# Patient Record
Sex: FEMALE | Race: BLACK OR AFRICAN AMERICAN | NOT HISPANIC OR LATINO | Employment: OTHER | URBAN - METROPOLITAN AREA
[De-identification: names, ages, dates, MRNs, and addresses within clinical notes are randomized per-mention and may not be internally consistent; named-entity substitution may affect disease eponyms.]

---

## 2023-11-02 ENCOUNTER — TELEPHONE (OUTPATIENT)
Dept: PULMONOLOGY | Facility: CLINIC | Age: 68
End: 2023-11-02
Payer: MEDICARE

## 2023-11-02 DIAGNOSIS — R06.02 SOB (SHORTNESS OF BREATH): Primary | ICD-10-CM

## 2023-11-05 ENCOUNTER — PATIENT MESSAGE (OUTPATIENT)
Dept: PULMONOLOGY | Facility: CLINIC | Age: 68
End: 2023-11-05
Payer: MEDICARE

## 2023-11-06 ENCOUNTER — OFFICE VISIT (OUTPATIENT)
Dept: PULMONOLOGY | Facility: CLINIC | Age: 68
End: 2023-11-06
Payer: MEDICARE

## 2023-11-06 ENCOUNTER — HOSPITAL ENCOUNTER (OUTPATIENT)
Dept: RADIOLOGY | Facility: HOSPITAL | Age: 68
Discharge: HOME | End: 2023-11-06
Attending: INTERNAL MEDICINE
Payer: MEDICARE

## 2023-11-06 VITALS
SYSTOLIC BLOOD PRESSURE: 130 MMHG | HEART RATE: 80 BPM | OXYGEN SATURATION: 97 % | DIASTOLIC BLOOD PRESSURE: 80 MMHG | WEIGHT: 189.38 LBS | RESPIRATION RATE: 16 BRPM | BODY MASS INDEX: 29.72 KG/M2 | HEIGHT: 67 IN

## 2023-11-06 DIAGNOSIS — R91.1 SOLITARY PULMONARY NODULE: ICD-10-CM

## 2023-11-06 DIAGNOSIS — R91.1 NODULE OF LOWER LOBE OF RIGHT LUNG: Primary | ICD-10-CM

## 2023-11-06 DIAGNOSIS — R06.02 SOB (SHORTNESS OF BREATH): ICD-10-CM

## 2023-11-06 PROBLEM — I10 HYPERTENSION: Status: ACTIVE | Noted: 2023-11-06

## 2023-11-06 PROBLEM — I63.9 CEREBROVASCULAR ACCIDENT (CVA): Status: ACTIVE | Noted: 2019-10-18

## 2023-11-06 PROBLEM — N30.00 ACUTE CYSTITIS: Status: ACTIVE | Noted: 2023-05-31

## 2023-11-06 PROBLEM — N81.3 THIRD DEGREE UTERINE PROLAPSE: Status: ACTIVE | Noted: 2023-05-31

## 2023-11-06 PROBLEM — E11.9 TYPE 2 DIABETES MELLITUS: Status: ACTIVE | Noted: 2023-11-06

## 2023-11-06 PROBLEM — E78.5 HYPERLIPIDEMIA: Status: ACTIVE | Noted: 2023-11-06

## 2023-11-06 PROCEDURE — 1101F PT FALLS ASSESS-DOCD LE1/YR: CPT | Mod: CPTII,S$GLB,, | Performed by: INTERNAL MEDICINE

## 2023-11-06 PROCEDURE — 99204 PR OFFICE/OUTPT VISIT, NEW, LEVL IV, 45-59 MIN: ICD-10-PCS | Mod: S$GLB,,, | Performed by: INTERNAL MEDICINE

## 2023-11-06 PROCEDURE — 3075F SYST BP GE 130 - 139MM HG: CPT | Mod: CPTII,S$GLB,, | Performed by: INTERNAL MEDICINE

## 2023-11-06 PROCEDURE — 99204 OFFICE O/P NEW MOD 45 MIN: CPT | Mod: S$GLB,,, | Performed by: INTERNAL MEDICINE

## 2023-11-06 PROCEDURE — 3079F DIAST BP 80-89 MM HG: CPT | Mod: CPTII,S$GLB,, | Performed by: INTERNAL MEDICINE

## 2023-11-06 PROCEDURE — 3008F PR BODY MASS INDEX (BMI) DOCUMENTED: ICD-10-PCS | Mod: CPTII,S$GLB,, | Performed by: INTERNAL MEDICINE

## 2023-11-06 PROCEDURE — 3075F PR MOST RECENT SYSTOLIC BLOOD PRESS GE 130-139MM HG: ICD-10-PCS | Mod: CPTII,S$GLB,, | Performed by: INTERNAL MEDICINE

## 2023-11-06 PROCEDURE — 3079F PR MOST RECENT DIASTOLIC BLOOD PRESSURE 80-89 MM HG: ICD-10-PCS | Mod: CPTII,S$GLB,, | Performed by: INTERNAL MEDICINE

## 2023-11-06 PROCEDURE — 99999 PR PBB SHADOW E&M-EST. PATIENT-LVL III: CPT | Mod: PBBFAC,,, | Performed by: INTERNAL MEDICINE

## 2023-11-06 PROCEDURE — 99999 PR PBB SHADOW E&M-EST. PATIENT-LVL III: ICD-10-PCS | Mod: PBBFAC,,, | Performed by: INTERNAL MEDICINE

## 2023-11-06 PROCEDURE — 71046 X-RAY EXAM CHEST 2 VIEWS: CPT | Mod: 26,,, | Performed by: RADIOLOGY

## 2023-11-06 PROCEDURE — 1101F PR PT FALLS ASSESS DOC 0-1 FALLS W/OUT INJ PAST YR: ICD-10-PCS | Mod: CPTII,S$GLB,, | Performed by: INTERNAL MEDICINE

## 2023-11-06 PROCEDURE — 71046 X-RAY EXAM CHEST 2 VIEWS: CPT | Mod: TC

## 2023-11-06 PROCEDURE — 71046 XR CHEST PA AND LATERAL: ICD-10-PCS | Mod: 26,,, | Performed by: RADIOLOGY

## 2023-11-06 PROCEDURE — 3288F FALL RISK ASSESSMENT DOCD: CPT | Mod: CPTII,S$GLB,, | Performed by: INTERNAL MEDICINE

## 2023-11-06 PROCEDURE — 3288F PR FALLS RISK ASSESSMENT DOCUMENTED: ICD-10-PCS | Mod: CPTII,S$GLB,, | Performed by: INTERNAL MEDICINE

## 2023-11-06 PROCEDURE — 3008F BODY MASS INDEX DOCD: CPT | Mod: CPTII,S$GLB,, | Performed by: INTERNAL MEDICINE

## 2023-11-06 RX ORDER — EMPAGLIFLOZIN 10 MG/1
10 TABLET, FILM COATED ORAL
COMMUNITY
Start: 2023-11-05

## 2023-11-06 RX ORDER — SIMVASTATIN 10 MG/1
10 TABLET, FILM COATED ORAL NIGHTLY
COMMUNITY
Start: 2023-11-05

## 2023-11-06 RX ORDER — AMLODIPINE BESYLATE 5 MG/1
5 TABLET ORAL
COMMUNITY
Start: 2023-11-05

## 2023-11-06 RX ORDER — IBANDRONATE SODIUM 150 MG/1
150 TABLET, FILM COATED ORAL
COMMUNITY
Start: 2023-09-05

## 2023-11-06 RX ORDER — SOLIFENACIN SUCCINATE 5 MG/1
5 TABLET, FILM COATED ORAL
COMMUNITY
Start: 2023-10-29

## 2023-11-06 RX ORDER — LINAGLIPTIN 5 MG/1
5 TABLET, FILM COATED ORAL
COMMUNITY
Start: 2023-11-05

## 2023-11-06 NOTE — LETTER
November 6, 2023      Chi Gann MD  [address]  [facility] [address]           The [address]  [address] [facility] [place] [address]  Phone: [phone]  Fax: [phone]          Patient: Yokasta Pizano   MR Number: [id number]   YOB: 1955   Date of Visit: 11/6/2023           Thank you for referring Yokasta Pizano to me for evaluation. Attached you will find relevant portions of my assessment and plan of care.    If you have questions, please do not hesitate to call me. I look forward to following Yokasta Pizano along with you.    Sincerely,    Dank Spence MD    Enclosure  CC:  Lisha Buchanan MD    If you would like to receive this communication electronically, please contact [email or web address] or [phone] to request [employer] Link access.    [employer] Link is a tool which provides read-only access to select patient information with whom you have a relationship. It's easy to use and provides real time access to review your patients record including encounter summaries, notes, results, and demographic information.    If you feel you have received this communication in error or would no longer like to receive these types of communications, please e-mail [email or web address]

## 2023-11-06 NOTE — TELEPHONE ENCOUNTER
The reason for visit listed is abnormal CT scan  Does she have an CT scans of the chest performed in the past ? I reviewed [facility] and [facility] - no luck  I cannot find anything in the system.  Go ahead and get a Chest X Ray as ordered

## 2023-11-06 NOTE — PROGRESS NOTES
"Subjective:     Patient ID: Yokasta Pizano is a 68 y.o. female.    Chief Complaint:      HPI Preop GYN surgery noted lung nodule on CT of the chest    Lung Nodule  She presents for evaluation and treatment of a lung nodule. The patient had an abnormal imaging study but reports experiencing no current or past pulmonary symptoms. The patient denies other associated symptoms. She has never smoked. The patient has no known exposure to tuberculosis. The patient does not have a history of cancer.      Past Medical History:   Diagnosis Date    Essential (primary) hypertension     Type 2 diabetes mellitus without complications     Uterine prolapse      No past surgical history on file.  Review of patient's allergies indicates:   Allergen Reactions    Ace inhibitors     Atorvastatin     Penicillins      Other reaction(s): Hives (code-173008110,SNOMED-CT)    [place]      Other reaction(s): Hives (code-172794507,SNOMED-CT)     Current Outpatient Medications on File Prior to Visit   Medication Sig Dispense Refill    amLODIPine (NORVASC) 5 MG tablet Take 5 mg by mouth.      ibandronate (BONIVA) 150 mg tablet Take 150 mg by mouth every 30 days.      JARDIANCE 10 mg tablet Take 10 mg by mouth.      simvastatin (ZOCOR) 10 MG tablet Take 10 mg by mouth every evening.      solifenacin (VESICARE) 5 MG tablet Take 5 mg by mouth.      TRADJENTA 5 mg Tab tablet Take 5 mg by mouth.       No current facility-administered medications on file prior to visit.     Social History     Socioeconomic History    Marital status:    Tobacco Use    Smoking status: Never    Smokeless tobacco: Never     No family history on file.    Review of Systems   Musculoskeletal:  Positive for arthralgias.       Objective:      /80   Pulse 80   Resp 16   Ht 5' 7" (1.702 m)   Wt 85.9 kg (189 lb 6 oz)   SpO2 97%   BMI 29.66 kg/m²   Physical Exam  Vitals and nursing note reviewed.   Constitutional:       Appearance: Normal appearance. She is " "well-developed.   HENT:      Head: Normocephalic and atraumatic.      Nose: Nose normal.   Eyes:      Conjunctiva/sclera: Conjunctivae normal.      Pupils: Pupils are equal, round, and reactive to light.   Neck:      Thyroid: No thyromegaly.      Vascular: No JVD.      Trachea: No tracheal deviation.   Cardiovascular:      Rate and Rhythm: Normal rate and regular rhythm.      Heart sounds: Normal heart sounds.   Pulmonary:      Effort: Pulmonary effort is normal. No respiratory distress.      Breath sounds: Normal breath sounds. No wheezing or rales.   Chest:      Chest wall: No tenderness.   Abdominal:      General: Bowel sounds are normal.      Palpations: Abdomen is soft.   Musculoskeletal:         General: Normal range of motion.      Cervical back: Neck supple.   Lymphadenopathy:      Cervical: No cervical adenopathy.   Skin:     General: Skin is warm and dry.   Neurological:      Mental Status: She is alert and oriented to person, place, and time.       Personal Diagnostic Review  CT reviewed - see report from outside disk        11/6/2023     2:39 PM   Pulmonary Studies Review   SpO2 97 %   Height 5' 7" (1.702 m)   Weight 85.9 kg (189 lb 6 oz)   BMI (Calculated) 29.7   Predicted Distance 307.89   Predicted Distance Meters (Calculated) 447.93 meters       X-Ray Chest PA And Lateral  Narrative: EXAMINATION:  XR CHEST PA AND LATERAL    CLINICAL HISTORY:  SOB; Shortness of breath    TECHNIQUE:  PA and lateral views of the chest were performed.    COMPARISON:  Prior radiographs    FINDINGS:  Mild cardiomegaly.  Mediastinal contours unremarkable.  Lungs are clear.  Osseous structures are intact.  Impression: Mild cardiomegaly without definite active pulmonary process.    Electronically signed by: Doron Fonseca MD  Date:    11/06/2023  Time:    14:29      Office Spirometry Results:         11/6/2023     2:39 PM   Pulmonary Function Tests   SpO2 97 %   Height 5' 7" (1.702 m)   Weight 85.9 kg (189 lb 6 oz)   BMI " "(Calculated) 29.7         11/6/2023     2:39 PM   Pulmonary Studies Review   SpO2 97 %   Height 5' 7" (1.702 m)   Weight 85.9 kg (189 lb 6 oz)   BMI (Calculated) 29.7   Predicted Distance 307.89   Predicted Distance Meters (Calculated) 447.93 meters     US Kidney  Order: [id number]  Addendum    Addendum by Johnson Chase MD on 05/09/2017  9:14 AM CDT   The examination was performed 3/13/2017  Impression    No acute abnormalities.  Narrative    Renal ultrasound     Indication:  Stage II kidney disease          Comparison:  none     The kidneys are normal in size measuring 10.9 cm in length on the right and 12.1 cm in length on the left. Cortical echotexture is normal.  No solid mass, shadowing calculus, or hydronephrosis is seen. There is a 2.3 cm simple cyst of the right kidney.   There is a 1.3 cm simple cyst of the left kidney.  Exam End: 03/13/17 10:53 Last Resulted: 03/13/17 13:32   Received From: [facility]idi[facility]ili[facility]  Result Received: 11/05/23 19:40                 Assessment:            Nodule of lower lobe of right lung  -     NM PET CT FDG Skull Base to Mid Thigh; Future; Expected date: 11/06/2023    Solitary pulmonary nodule  -     NM PET CT FDG Skull Base to Mid Thigh; Future; Expected date: 11/06/2023          Outpatient Encounter Medications as of 11/6/2023   Medication Sig Dispense Refill    amLODIPine (NORVASC) 5 MG tablet Take 5 mg by mouth.      ibandronate (BONIVA) 150 mg tablet Take 150 mg by mouth every 30 days.      JARDIANCE 10 mg tablet Take 10 mg by mouth.      simvastatin (ZOCOR) 10 MG tablet Take 10 mg by mouth every evening.      solifenacin (VESICARE) 5 MG tablet Take 5 mg by mouth.      TRADJENTA 5 mg Tab tablet Take 5 mg by mouth.       No facility-administered encounter medications on file as of 11/6/2023.     Plan:       Requested Prescriptions      No prescriptions requested or ordered in this encounter "     Problem List Items Addressed This Visit    None  Visit Diagnoses       Nodule of lower lobe of right lung    -  Primary    Relevant Orders    NM PET CT FDG Skull Base to Mid Thigh    Solitary pulmonary nodule        Relevant Orders    NM PET CT FDG Skull Base to Mid Thigh               Follow up in about 2 weeks (around 11/20/2023) for PET on return.    MEDICAL DECISION MAKING: Moderate to high complexity.  Overall, the multiple problems listed are of moderate to high severity that may impact quality of life and activities of daily living. Side effects of medications, treatment plan as well as options and alternatives reviewed and discussed with patient. There was counseling of patient concerning these issues.    Total time spent in counseling and coordination of care - 45  minutes of total time spent on the encounter, which includes face to face time and non-face to face time preparing to see the patient (eg, review of tests), Obtaining and/or reviewing separately obtained history, Documenting clinical information in the electronic or other health record, Independently interpreting results (not separately reported) and communicating results to the patient/family/caregiver, or Care coordination (not separately reported).    Time was used in discussion of prognosis, risks, benefits of treatment, instructions and compliance with regimen . Discussion with other physicians and/or health care providers - home health or for use of durable medical equipment (oxygen, nebulizers, CPAP, BiPAP) occurred.

## 2023-11-08 ENCOUNTER — HOSPITAL ENCOUNTER (OUTPATIENT)
Dept: RADIOLOGY | Facility: HOSPITAL | Age: 68
Discharge: HOME | End: 2023-11-08
Attending: INTERNAL MEDICINE
Payer: MEDICARE

## 2023-11-08 DIAGNOSIS — R91.1 SOLITARY PULMONARY NODULE: ICD-10-CM

## 2023-11-08 DIAGNOSIS — R91.1 NODULE OF LOWER LOBE OF RIGHT LUNG: ICD-10-CM

## 2023-11-08 PROCEDURE — 78815 NM PET CT FDG SKULL BASE TO MID THIGH: ICD-10-PCS | Mod: 26,PI,, | Performed by: RADIOLOGY

## 2023-11-08 PROCEDURE — A9552 F18 FDG: HCPCS

## 2023-11-08 PROCEDURE — 78815 PET IMAGE W/CT SKULL-THIGH: CPT | Mod: 26,PI,, | Performed by: RADIOLOGY

## 2023-11-10 ENCOUNTER — OFFICE VISIT (OUTPATIENT)
Dept: PULMONOLOGY | Facility: CLINIC | Age: 68
End: 2023-11-10
Payer: MEDICARE

## 2023-11-10 DIAGNOSIS — R91.1 SOLITARY PULMONARY NODULE: Primary | ICD-10-CM

## 2023-11-10 DIAGNOSIS — N81.3 THIRD DEGREE UTERINE PROLAPSE: ICD-10-CM

## 2023-11-10 DIAGNOSIS — Z01.811 PREOP PULMONARY/RESPIRATORY EXAM: ICD-10-CM

## 2023-11-10 PROCEDURE — 1160F RVW MEDS BY RX/DR IN RCRD: CPT | Mod: CPTII,S$GLB,, | Performed by: INTERNAL MEDICINE

## 2023-11-10 PROCEDURE — 99214 OFFICE O/P EST MOD 30 MIN: CPT | Mod: S$GLB,,, | Performed by: INTERNAL MEDICINE

## 2023-11-10 PROCEDURE — 1159F MED LIST DOCD IN RCRD: CPT | Mod: CPTII,S$GLB,, | Performed by: INTERNAL MEDICINE

## 2023-11-10 PROCEDURE — 1160F PR REVIEW ALL MEDS BY PRESCRIBER/CLIN PHARMACIST DOCUMENTED: ICD-10-PCS | Mod: CPTII,S$GLB,, | Performed by: INTERNAL MEDICINE

## 2023-11-10 PROCEDURE — 1159F PR MEDICATION LIST DOCUMENTED IN MEDICAL RECORD: ICD-10-PCS | Mod: CPTII,S$GLB,, | Performed by: INTERNAL MEDICINE

## 2023-11-10 PROCEDURE — 99214 PR OFFICE/OUTPT VISIT, EST, LEVL IV, 30-39 MIN: ICD-10-PCS | Mod: S$GLB,,, | Performed by: INTERNAL MEDICINE

## 2023-11-10 NOTE — LETTER
November 10, 2023      [facility]  [address] [place] [address]  [place] [address]  Phone: [phone]  Fax: [phone]          Patient: Yokasta Pizano   MR Number: [id number]   YOB: 1955   Date of Visit: 11/10/2023       Dear No ref. provider found:    Thank you for referring Yokasta Pizano to me for evaluation. Attached you will find relevant portions of my assessment and plan of care.    If you have questions, please do not hesitate to call me. I look forward to following Yokasta Pizano along with you.    Sincerely,    Dank Spence MD    Enclosure  CC:  MD Chi Lanza MD    If you would like to receive this communication electronically, please contact [email or web address] or [phone] to request [employer] access.    [employer] is a tool which provides read-only access to select patient information with whom you have a relationship. It's easy to use and provides real time access to review your patients record including encounter summaries, notes, results, and demographic information.    If you feel you have received this communication in error or would no longer like to receive these types of communications, please e-mail [email or web address]

## 2024-02-05 PROBLEM — I63.9 CEREBROVASCULAR ACCIDENT (CVA): Status: RESOLVED | Noted: 2019-10-18 | Resolved: 2024-02-05

## 2024-02-07 ENCOUNTER — TELEPHONE (OUTPATIENT)
Dept: PULMONOLOGY | Facility: CLINIC | Age: 69
End: 2024-02-07
Payer: MEDICARE

## 2024-02-07 ENCOUNTER — HOSPITAL ENCOUNTER (OUTPATIENT)
Dept: RADIOLOGY | Facility: HOSPITAL | Age: 69
Discharge: HOME | End: 2024-02-07
Attending: INTERNAL MEDICINE
Payer: MEDICARE

## 2024-02-07 ENCOUNTER — CLINICAL SUPPORT (OUTPATIENT)
Dept: PULMONOLOGY | Facility: CLINIC | Age: 69
End: 2024-02-07
Attending: INTERNAL MEDICINE
Payer: MEDICARE

## 2024-02-07 VITALS
DIASTOLIC BLOOD PRESSURE: 88 MMHG | HEART RATE: 80 BPM | SYSTOLIC BLOOD PRESSURE: 124 MMHG | WEIGHT: 189.63 LBS | OXYGEN SATURATION: 98 % | HEIGHT: 67 IN | RESPIRATION RATE: 18 BRPM | BODY MASS INDEX: 29.76 KG/M2

## 2024-02-07 DIAGNOSIS — R06.02 SOB (SHORTNESS OF BREATH): ICD-10-CM

## 2024-02-07 DIAGNOSIS — Z01.811 PREOP PULMONARY/RESPIRATORY EXAM: ICD-10-CM

## 2024-02-07 DIAGNOSIS — Z01.811 PREOP PULMONARY/RESPIRATORY EXAM: Primary | ICD-10-CM

## 2024-02-07 DIAGNOSIS — R91.1 SOLITARY PULMONARY NODULE: Primary | ICD-10-CM

## 2024-02-07 LAB
ALLENS TEST: ABNORMAL
DELSYS: ABNORMAL
FIO2: 21
HCO3 UR-SCNC: 24.9 MMOL/L (ref 24–28)
MODE: ABNORMAL
PCO2 BLDA: 34.9 MMHG (ref 35–45)
PH SMN: 7.46 [PH] (ref 7.35–7.45)
PO2 BLDA: 104 MMHG (ref 80–100)
POC BE: 1 MMOL/L
POC SATURATED O2: 98 % (ref 95–100)
SAMPLE: ABNORMAL
SITE: ABNORMAL

## 2024-02-07 PROCEDURE — 1159F MED LIST DOCD IN RCRD: CPT | Mod: CPTII,S$GLB,, | Performed by: INTERNAL MEDICINE

## 2024-02-07 PROCEDURE — 71046 X-RAY EXAM CHEST 2 VIEWS: CPT | Mod: 26,,, | Performed by: RADIOLOGY

## 2024-02-07 PROCEDURE — 82803 BLOOD GASES ANY COMBINATION: CPT | Mod: S$GLB,,, | Performed by: INTERNAL MEDICINE

## 2024-02-07 PROCEDURE — 3008F BODY MASS INDEX DOCD: CPT | Mod: CPTII,S$GLB,, | Performed by: INTERNAL MEDICINE

## 2024-02-07 PROCEDURE — 94010 BREATHING CAPACITY TEST: CPT | Mod: S$GLB,,, | Performed by: INTERNAL MEDICINE

## 2024-02-07 PROCEDURE — 1160F RVW MEDS BY RX/DR IN RCRD: CPT | Mod: CPTII,S$GLB,, | Performed by: INTERNAL MEDICINE

## 2024-02-07 PROCEDURE — 36600 WITHDRAWAL OF ARTERIAL BLOOD: CPT | Mod: 59,S$GLB,, | Performed by: INTERNAL MEDICINE

## 2024-02-07 PROCEDURE — 1126F AMNT PAIN NOTED NONE PRSNT: CPT | Mod: CPTII,S$GLB,, | Performed by: INTERNAL MEDICINE

## 2024-02-07 PROCEDURE — 3074F SYST BP LT 130 MM HG: CPT | Mod: CPTII,S$GLB,, | Performed by: INTERNAL MEDICINE

## 2024-02-07 PROCEDURE — 71046 X-RAY EXAM CHEST 2 VIEWS: CPT | Mod: TC

## 2024-02-07 PROCEDURE — 1101F PT FALLS ASSESS-DOCD LE1/YR: CPT | Mod: CPTII,S$GLB,, | Performed by: INTERNAL MEDICINE

## 2024-02-07 PROCEDURE — 3288F FALL RISK ASSESSMENT DOCD: CPT | Mod: CPTII,S$GLB,, | Performed by: INTERNAL MEDICINE

## 2024-02-07 PROCEDURE — 99999 PR PBB SHADOW E&M-EST. PATIENT-LVL III: CPT | Mod: PBBFAC,,, | Performed by: INTERNAL MEDICINE

## 2024-02-07 PROCEDURE — 3079F DIAST BP 80-89 MM HG: CPT | Mod: CPTII,S$GLB,, | Performed by: INTERNAL MEDICINE

## 2024-02-07 PROCEDURE — 99214 OFFICE O/P EST MOD 30 MIN: CPT | Mod: 25,S$GLB,, | Performed by: INTERNAL MEDICINE

## 2024-02-07 NOTE — TELEPHONE ENCOUNTER
----- Message from Marga Ludwig sent at 2/7/2024 12:03 PM CST -----  Contact: self  [phone]  Patient called in this afternoon stating she needs her clearance for surgery, she is scheduled to see the doctor this afternoon but she does not have a ride. Her surgery is tomorrow and if she does not receive the clearance they will cancel her surgery. Please call back  [phone] thanks willie

## 2024-02-07 NOTE — PROGRESS NOTES
Subjective:     Patient ID: Yokasta Pizano is a 68 y.o. female.    Chief Complaint:  Review of PET scan     HPI Preop Urologic surgery for Urethral Stent. Also a history of lung nodule on CT of the chest. Nonsmoker, no prior history of pulmonary problems    Lung Nodule  She presents for evaluation and treatment of a lung nodule. The patient had an abnormal imaging study but reports experiencing no current or past pulmonary symptoms. The patient denies other associated symptoms. She has never smoked. The patient has no known exposure to tuberculosis. The patient does not have a history of cancer.  No history of active pulmonary problems      Dyspnea  Patient complains of shortness of breath. Symptoms occur after more than one flight stairs. Symptoms began 1 year ago, unchanged since. Associated symptoms include  no cough. She denies chest pain, located left chest. She does not have had recent travel. Weight has been stable. Symptoms are exacerbated by strenuous activity. Symptoms are alleviated by rest.       Past Medical History:   Diagnosis Date    Essential (primary) hypertension     Solitary pulmonary nodule - PET negative 11/10/2023    Type 2 diabetes mellitus without complications     Uterine prolapse      No past surgical history on file.  Review of patient's allergies indicates:   Allergen Reactions    Ace inhibitors     Atorvastatin     Penicillins      Other reaction(s): Hives (code-018777262,SNOMED-CT)    [place]      Other reaction(s): Hives (code-937171326,SNOMED-CT)     Current Outpatient Medications on File Prior to Visit   Medication Sig Dispense Refill    amLODIPine (NORVASC) 5 MG tablet Take 5 mg by mouth.      ibandronate (BONIVA) 150 mg tablet Take 150 mg by mouth every 30 days.      JARDIANCE 10 mg tablet Take 10 mg by mouth.      simvastatin (ZOCOR) 10 MG tablet Take 10 mg by mouth every evening.      solifenacin (VESICARE) 5 MG tablet Take 5 mg by mouth.      TRADJENTA 5 mg Tab tablet Take 5  "mg by mouth.       No current facility-administered medications on file prior to visit.     Social History     Socioeconomic History    Marital status:    Tobacco Use    Smoking status: Never    Smokeless tobacco: Never     No family history on file.    Review of Systems   Musculoskeletal:  Positive for arthralgias.       Objective:      /88   Pulse 80   Resp 18   Ht 5' 7" (1.702 m)   Wt 86 kg (189 lb 9.5 oz)   SpO2 98%   BMI 29.69 kg/m²   Physical Exam  Vitals and nursing note reviewed.   Constitutional:       Appearance: Normal appearance. She is well-developed.   HENT:      Head: Normocephalic and atraumatic.      Nose: Nose normal.   Eyes:      Conjunctiva/sclera: Conjunctivae normal.      Pupils: Pupils are equal, round, and reactive to light.   Neck:      Thyroid: No thyromegaly.      Vascular: No JVD.      Trachea: No tracheal deviation.   Cardiovascular:      Rate and Rhythm: Normal rate and regular rhythm.      Heart sounds: Normal heart sounds.   Pulmonary:      Effort: Pulmonary effort is normal. No respiratory distress.      Breath sounds: Normal breath sounds. No wheezing or rales.   Chest:      Chest wall: No tenderness.   Abdominal:      General: Bowel sounds are normal.      Palpations: Abdomen is soft.   Musculoskeletal:         General: Normal range of motion.      Cervical back: Neck supple.   Lymphadenopathy:      Cervical: No cervical adenopathy.   Skin:     General: Skin is warm and dry.   Neurological:      Mental Status: She is alert and oriented to person, place, and time.       Personal Diagnostic Review  ABG - normal   Latest Reference Range & Units 02/07/24 15:03   POC PH 7.35 - 7.45  7.461 (H)   POC PCO2 35 - 45 mmHg 34.9 (L)   POC PO2 80 - 100 mmHg 104 (H)   POC HCO3 24 - 28 mmol/L 24.9   POC SATURATED O2 95 - 100 % 98   Sample  ARTERIAL   POC BE -2 to 2 mmol/L 1   FiO2  21   [employer]Sys  Room Air   Site  LB   Mode  SPONT   (H): Data is abnormally high  (L): Data is " abnormally low        EXAMINATION:  NM PET CT FDG SKULL BASE TO MID THIGH     CLINICAL HISTORY:  Lung nodule, > 8mm; Solitary pulmonary nodule     TECHNIQUE:  11.6 mCi of F18-FDG was administered intravenously in the left antecubital.  After an approximately 60 min distribution time, PET/CT images were acquired from the skull base to the mid thigh. Transmission images were acquired to correct for attenuation using a whole body low-dose CT scan without contrast with the arms positioned above the head. Glycemia at the time of injection was 93 mg/dL.     COMPARISON:  Chest radiograph on 11/06/2023     FINDINGS:  Neck: No abnormal foci of FDG uptake identified.     Chest: 1.1 x 1.6 cm nodular opacity in the left lung base that has FDG accumulation similar to blood pool.  No hypermetabolic intrathoracic lymphadenopathy.  Non FDG avid soft tissue density mass in the superior mediastinum measuring 2.2 x 2.3 cm on axial image 100 of series 3.     Mildly hypermetabolic right axillary lymph node measuring 1.0 x 1.8 cm on axial image 114 with maximum SUV of 3.7.     Abdomen: No abnormal foci of FDG uptake identified.  Moderate to severe right and mild left hydroureteronephrosis.  There is extrinsic mass effect upon the distal ureters secondary marked pelvic prolapse resulting in a cystocele and rectocele.     At least 3 mildly hypermetabolic ovoid soft tissue masses in the lower abdomen adjacent to the right ureter measuring 1.7 x 2.4 cm on axial image 301 of series 3 with maximum SUV of 7.1.  The 2nd largest mass measures 1.4 x 1.7 cm on axial image 267.  These likely represent lymph nodes.     Pelvis: Mildly hypermetabolic left external iliac lymph node measuring 2.4 x 0.9 cm with maximum SUV of 5.9.  Mildly hypermetabolic left inguinal lymph node measuring 1.1 x 0.9 cm with maximum SUV of 4.5.     Musculoskeletal: No abnormal foci of FDG uptake identified.     Impression:     1.6 cm nodular opacity in the left lung base is  "non FDG avid.  Recommend six-month follow-up chest CT.     Several mildly hypermetabolic, enlarged lymph nodes in the right axilla, lower abdominal mesentery, and left pelvis raising concern for malignancy.  Consider biopsy of the right axillary lymph node.     Non FDG avid soft tissue density mass in the superior mediastinum of indeterminate etiology, likely benign.     Bilateral hydroureteronephrosis, right leg greater than left secondary to extrinsic mass effect from the large cystocele.        Electronically signed by: Wil Oropeza MD  Date:                                            11/08/2023  Time:                                           10:06      CT reviewed - see report from outside disk        2/7/2024     3:38 PM   Pulmonary Studies Review   SpO2 98 %   Height 5' 7" (1.702 m)   Weight 86 kg (189 lb 9.5 oz)   BMI (Calculated) 29.7   Predicted Distance 307.89   Predicted Distance Meters (Calculated) 447.7 meters       X-Ray Chest PA And Lateral  Narrative: EXAMINATION:  XR CHEST PA AND LATERAL    CLINICAL HISTORY:  SOB; Shortness of breath    TECHNIQUE:  PA and lateral views of the chest were performed.    COMPARISON:  11/06/2023    FINDINGS:  The lungs are clear and free of infiltrate.  No pleural effusion or pneumothorax. The heart is not enlarged. There is tortuosity of the descending thoracic aorta.  Impression: 1.  No acute cardiopulmonary process.    Electronically signed by: Eljiah Koch DO  Date:    02/07/2024  Time:    14:47      Office Spirometry Results:         2/7/2024     3:38 PM 11/6/2023     2:39 PM   Pulmonary Function Tests   SpO2 98 % 97 %   Height 5' 7" (1.702 m) 5' 7" (1.702 m)   Weight 86 kg (189 lb 9.5 oz) 85.9 kg (189 lb 6 oz)   BMI (Calculated) 29.7 29.7         2/7/2024     3:38 PM   Pulmonary Studies Review   SpO2 98 %   Height 5' 7" (1.702 m)   Weight 86 kg (189 lb 9.5 oz)   BMI (Calculated) 29.7   Predicted Distance 307.89   Predicted Distance Meters (Calculated) 447.7 meters "     US Kidney  Order: [id number]  Addendum    Addendum by Johnson Chase MD on 05/09/2017  9:14 AM CDT   The examination was performed 3/13/2017  Impression    No acute abnormalities.  Narrative    Renal ultrasound     Indication:  Stage II kidney disease          Comparison:  none     The kidneys are normal in size measuring 10.9 cm in length on the right and 12.1 cm in length on the left. Cortical echotexture is normal.  No solid mass, shadowing calculus, or hydronephrosis is seen. There is a 2.3 cm simple cyst of the right kidney.   There is a 1.3 cm simple cyst of the left kidney.  Exam End: 03/13/17 10:53 Last Resulted: 03/13/17 13:32   [facility] From[facility]  Result Received: 11/05/23 19:40                 Assessment:       Preop pulmonary/respiratory exam - patient is cleared for surgery from a pulmonary standpoint  Patient is cleared for surgery .  Nonsmoker -  No present pulmonary symptoms.    Solitary pulmonary nodule - PET negative  Stable, Negative PET    Solitary pulmonary nodule    Preop pulmonary/respiratory exam          Outpatient Encounter Medications as of 2/7/2024   Medication Sig Dispense Refill    amLODIPine (NORVASC) 5 MG tablet Take 5 mg by mouth.      ibandronate (BONIVA) 150 mg tablet Take 150 mg by mouth every 30 days.      JARDIANCE 10 mg tablet Take 10 mg by mouth.      simvastatin (ZOCOR) 10 MG tablet Take 10 mg by mouth every evening.      solifenacin (VESICARE) 5 MG tablet Take 5 mg by mouth.      TRADJENTA 5 mg Tab tablet Take 5 mg by mouth.       No facility-administered encounter medications on file as of 2/7/2024.     Plan:       Requested Prescriptions      No prescriptions requested or ordered in this encounter     Problem List Items Addressed This Visit       Preop pulmonary/respiratory exam - patient is cleared for surgery from a pulmonary standpoint    Current Assessment & Plan     Patient is  cleared for surgery .  Nonsmoker -  No present pulmonary symptoms.         Solitary pulmonary nodule - PET negative - Primary    Overview     Nonsmoker, PET negative         Current Assessment & Plan     Stable, Negative PET             Follow up if symptoms worsen or fail to improve.    MEDICAL DECISION MAKING: Moderate to high complexity.  Overall, the multiple problems listed are of moderate to high severity that may impact quality of life and activities of daily living. Side effects of medications, treatment plan as well as options and alternatives reviewed and discussed with patient. There was counseling of patient concerning these issues.    Total time spent in counseling and coordination of care - 30  minutes of total time spent on the encounter, which includes face to face time and non-face to face time preparing to see the patient (eg, review of tests), Obtaining and/or reviewing separately obtained history, Documenting clinical information in the electronic or other health record, Independently interpreting results (not separately reported) and communicating results to the patient/family/caregiver, or Care coordination (not separately reported).    Time was used in discussion of prognosis, risks, benefits of treatment, instructions and compliance with regimen . Discussion with other physicians and/or health care providers - home health or for use of durable medical equipment (oxygen, nebulizers, CPAP, BiPAP) occurred.

## 2024-02-07 NOTE — PROCEDURES
ABG completed. See ABG Below.    Component      Latest Ref Rng 2/7/2024   POC PH      7.35 - 7.45  7.461 (H)    POC PCO2      35 - 45 mmHg 34.9 (L)    POC PO2      80 - 100 mmHg 104 (H)    POC HCO3      24 - 28 mmol/L 24.9    POC BE      -2 to 2 mmol/L 1    POC SATURATED O2      95 - 100 % 98    Sample ARTERIAL    Site LB    Allens Test N/A    DelSys Room Air    Mode SPONT    FiO2 21       Legend:  (H) High  (L) Low      Interpretation:    [x] Arterial blood gases on room air are abnormal demonstrating hypocarbia (pCO2 < 35 mmHg)    [x] Arterial blood gases on room air are abnormal demonstrating hyperoxemia (pO2 >120 mmHg)      The table below summarizes the main interpretations of the relationship between the arterial blood gases, pH, pCO2 and HCO-3    []    I

## 2024-02-07 NOTE — TELEPHONE ENCOUNTER
Spoke to pt concerning appts for today.  She stated that she doesn't have a ride and might not be able to make it.  Explained to her that the surgeon only notify us this morning of the need for surgery clearance. Appts were scheduled immediately and a VM left for her at 10:23am.

## 2024-02-07 NOTE — TELEPHONE ENCOUNTER
----- Message from Sue Yee sent at 2/7/2024  9:40 AM CST -----  Regarding: clearance  Name of who is calling:   Dr Gann office / [facility]      What is the request in detail: Requesting a medical clearance for pt/ Surgery scheduled for tomorrow 02/08/24      Can the clinic reply by [facility]:no      What number to call back if not [facility]:[phone] / fax #[phone]

## 2024-02-07 NOTE — LETTER
February 7, 2024      Chi Gann MD  [address]  [facility] [address]           [facility]  [address] [place] [id number]  Phone: [phone]  Fax: [phone]          Patient: Yokasta Pizano   MR Number: [id number]   YOB: 1955   Date of Visit: 2/7/2024         Thank you for referring Yokasta Pizano to me for evaluation. Attached you will find relevant portions of my assessment and plan of care.    If you have questions, please do not hesitate to call me. I look forward to following Yokasta Pizano along with you.    Sincerely,    Dank Spence MD    Enclosure  CC:  Pee Buchanan MD    If you would like to receive this communication electronically, please contact [email or web address] or [phone] to request [employer] ([employer]) Link access.    [employer] ([employer] Link is a tool which provides read-only access to select patient information with whom you have a relationship. It's easy to use and provides real time access to review your patients record including encounter summaries, notes, results, and demographic information.    If you feel you have received this communication in error or would no longer like to receive these types of communications, please e-mail [email or web address]

## 2024-02-22 LAB
BRPFT: ABNORMAL
BRPFT: ABNORMAL
FEF 25 75 LLN: 0.75
FEF 25 75 PRE REF: 142.1 %
FEF 25 75 REF: 1.88
FEV1 FVC LLN: 66
FEV1 FVC PRE REF: 118.6 %
FEV1 FVC REF: 79
FEV1 LLN: 1.55
FEV1 PRE REF: 68.9 %
FEV1 REF: 2.19
FVC LLN: 2.02
FVC PRE REF: 57.7 %
FVC REF: 2.8
PEF LLN: 3.47
PEF PRE REF: 78.4 %
PEF REF: 5.66
PRE FEF 25 75: 2.67 L/S (ref 0.75–3.02)
PRE FET 100: 1.38 SEC
PRE FEV1 FVC: 93.26 % (ref 66.22–91.01)
PRE FEV1: 1.51 L (ref 1.55–2.83)
PRE FVC: 1.62 L (ref 2.02–3.59)
PRE PEF: 4.44 L/S (ref 3.47–7.85)

## 2024-02-27 ENCOUNTER — TELEPHONE (OUTPATIENT)
Dept: PULMONOLOGY | Facility: CLINIC | Age: 69
End: 2024-02-27
Payer: MEDICARE

## 2024-02-27 DIAGNOSIS — R91.1 SOLITARY PULMONARY NODULE: Primary | ICD-10-CM

## 2024-02-27 NOTE — TELEPHONE ENCOUNTER
----- Message from Funmi Guzman sent at 2/27/2024  1:46 PM CST -----  Contact: Yokasta Templeton is calling to speak to the nurse regarding a pre op appointment in March, she is having surgery in  April, please give her a call at  [phone]    Thanks  LJ

## 2024-03-26 ENCOUNTER — OFFICE VISIT (OUTPATIENT)
Dept: PULMONOLOGY | Facility: CLINIC | Age: 69
End: 2024-03-26
Attending: INTERNAL MEDICINE
Payer: MEDICARE

## 2024-03-26 ENCOUNTER — HOSPITAL ENCOUNTER (OUTPATIENT)
Dept: RADIOLOGY | Facility: HOSPITAL | Age: 69
Discharge: HOME | End: 2024-03-26
Attending: INTERNAL MEDICINE
Payer: MEDICARE

## 2024-03-26 VITALS
SYSTOLIC BLOOD PRESSURE: 134 MMHG | OXYGEN SATURATION: 98 % | HEIGHT: 67 IN | RESPIRATION RATE: 16 BRPM | DIASTOLIC BLOOD PRESSURE: 78 MMHG | BODY MASS INDEX: 29.17 KG/M2 | WEIGHT: 185.88 LBS | HEART RATE: 90 BPM

## 2024-03-26 DIAGNOSIS — R91.1 SOLITARY PULMONARY NODULE: ICD-10-CM

## 2024-03-26 DIAGNOSIS — Z01.811 PRE-OPERATIVE RESPIRATORY EXAMINATION: ICD-10-CM

## 2024-03-26 DIAGNOSIS — R91.1 SOLITARY PULMONARY NODULE: Primary | ICD-10-CM

## 2024-03-26 LAB
ALLENS TEST: NORMAL
DELSYS: NORMAL
FIO2: 21
HCO3 UR-SCNC: 24.6 MMOL/L (ref 24–28)
MODE: NORMAL
PCO2 BLDA: 38.9 MMHG (ref 35–45)
PH SMN: 7.41 [PH] (ref 7.35–7.45)
PO2 BLDA: 91 MMHG (ref 80–100)
POC BE: 0 MMOL/L
POC SATURATED O2: 97 % (ref 95–100)
SAMPLE: NORMAL
SITE: NORMAL

## 2024-03-26 PROCEDURE — 3078F DIAST BP <80 MM HG: CPT | Mod: CPTII,S$GLB,, | Performed by: PHYSICIAN ASSISTANT

## 2024-03-26 PROCEDURE — 1160F RVW MEDS BY RX/DR IN RCRD: CPT | Mod: CPTII,S$GLB,, | Performed by: PHYSICIAN ASSISTANT

## 2024-03-26 PROCEDURE — 1101F PT FALLS ASSESS-DOCD LE1/YR: CPT | Mod: CPTII,S$GLB,, | Performed by: PHYSICIAN ASSISTANT

## 2024-03-26 PROCEDURE — 94060 EVALUATION OF WHEEZING: CPT | Mod: S$GLB,,, | Performed by: INTERNAL MEDICINE

## 2024-03-26 PROCEDURE — 36600 WITHDRAWAL OF ARTERIAL BLOOD: CPT | Mod: 59,S$GLB,, | Performed by: INTERNAL MEDICINE

## 2024-03-26 PROCEDURE — 82803 BLOOD GASES ANY COMBINATION: CPT | Mod: S$GLB,,, | Performed by: INTERNAL MEDICINE

## 2024-03-26 PROCEDURE — 1159F MED LIST DOCD IN RCRD: CPT | Mod: CPTII,S$GLB,, | Performed by: PHYSICIAN ASSISTANT

## 2024-03-26 PROCEDURE — 71046 X-RAY EXAM CHEST 2 VIEWS: CPT | Mod: TC

## 2024-03-26 PROCEDURE — 99214 OFFICE O/P EST MOD 30 MIN: CPT | Mod: 25,S$GLB,, | Performed by: PHYSICIAN ASSISTANT

## 2024-03-26 PROCEDURE — 3075F SYST BP GE 130 - 139MM HG: CPT | Mod: CPTII,S$GLB,, | Performed by: PHYSICIAN ASSISTANT

## 2024-03-26 PROCEDURE — 99999 PR PBB SHADOW E&M-EST. PATIENT-LVL III: CPT | Mod: PBBFAC,,, | Performed by: PHYSICIAN ASSISTANT

## 2024-03-26 PROCEDURE — 71046 X-RAY EXAM CHEST 2 VIEWS: CPT | Mod: 26,,, | Performed by: RADIOLOGY

## 2024-03-26 PROCEDURE — 3008F BODY MASS INDEX DOCD: CPT | Mod: CPTII,S$GLB,, | Performed by: PHYSICIAN ASSISTANT

## 2024-03-26 PROCEDURE — 3288F FALL RISK ASSESSMENT DOCD: CPT | Mod: CPTII,S$GLB,, | Performed by: PHYSICIAN ASSISTANT

## 2024-03-26 NOTE — PROGRESS NOTES
Subjective:       Patient ID: Yokasta Pizano is a 68 y.o. female.    Chief Complaint: pre-op      3/27/2024  Here for pulmonary risk assessment visit  New patient to me, last seen by Dr. Spence for restrictive lung disease and pulmonary nodule  PET CT 11/2023: 1.6 cm nodular opacity in the left lung base is non FDG avid.  Hypermetabolic axillary lymph nodes, had biopsy 12/2023 - negative for malignancy    She is scheduled for hysterectomy and bladder repair 4/11/24 with Dr. Lisha Dodd and Dr. Pee Buchanan    Last surgery was last month had kidney cyst drained - no post operative complications    She is doing well today without cough, SOB, chest pain, or fever  No respiratory infections in the last month  Completed Chest X Ray, zenaida and ABG    Immunization History   Administered Date(s) Administered    COVID-19, MRNA, LN-S, PF (Pfizer) (Purple Cap) 03/04/2021, 03/25/2021, 12/18/2021    COVID-19, mRNA, LNP-S, bivalent booster, PF (PFIZER OMICRON) 12/10/2022    Influenza (FLUBLOK) - Quadrivalent - Recombinant - PF *Preferred* (egg allergy) 02/13/2022    Influenza - Quadrivalent - High Dose - PF (65 years and older) 02/05/2023    Influenza - Quadrivalent - PF *Preferred* (6 months and older) 10/18/2019, 12/08/2020      Tobacco Use: Low Risk  (3/27/2024)    Patient History     Smoking Tobacco Use: Never     Smokeless Tobacco Use: Never     Passive Exposure: Not on file      Past Medical History:   Diagnosis Date    Essential (primary) hypertension     Solitary pulmonary nodule - PET negative 11/10/2023    Type 2 diabetes mellitus without complications     Uterine prolapse       Current Outpatient Medications on File Prior to Visit   Medication Sig Dispense Refill    amLODIPine (NORVASC) 5 MG tablet Take 5 mg by mouth.      ibandronate (BONIVA) 150 mg tablet Take 150 mg by mouth every 30 days.      JARDIANCE 10 mg tablet Take 10 mg by mouth.      simvastatin (ZOCOR) 10 MG tablet Take 10 mg by mouth every  "evening.      solifenacin (VESICARE) 5 MG tablet Take 5 mg by mouth.      TRADJENTA 5 mg Tab tablet Take 5 mg by mouth.       No current facility-administered medications on file prior to visit.        Review of Systems   Constitutional:  Negative for fever, weight loss, appetite change and weakness.   HENT:  Negative for postnasal drip, rhinorrhea, sinus pressure, trouble swallowing and congestion.    Respiratory:  Negative for cough, sputum production, choking, chest tightness, shortness of breath, wheezing and dyspnea on extertion.    Cardiovascular:  Negative for chest pain and leg swelling.   Musculoskeletal:  Negative for joint swelling.   Gastrointestinal:  Negative for nausea and vomiting.   Neurological:  Negative for dizziness, weakness and headaches.   All other systems reviewed and are negative.      Objective:       Vitals:    03/26/24 1514   BP: 134/78   Pulse: 90   Resp: 16   SpO2: 98%   Weight: 84.3 kg (185 lb 13.6 oz)   Height: 5' 7" (1.702 m)       Physical Exam   Constitutional: She is oriented to person, place, and time. She appears well-developed and well-nourished. No distress.   HENT:   Head: Normocephalic.   Nose: Nose normal.   Mouth/Throat: Oropharynx is clear and moist.   Cardiovascular: Normal rate and regular rhythm.   Pulmonary/Chest: Effort normal. No respiratory distress. She has no wheezes. She has no rhonchi. She has no rales.   Musculoskeletal:         General: No edema.      Cervical back: Normal range of motion and neck supple.   Neurological: She is alert and oriented to person, place, and time. Gait normal.   Skin: Skin is warm and dry.   Psychiatric: She has a normal mood and affect.   Vitals reviewed.    Personal Diagnostic Review    X-Ray Chest PA And Lateral  Narrative: EXAM: XR CHEST PA AND LATERAL    CLINICAL HISTORY:  SOB; [R91.1]-Solitary pulmonary nodule.    COMPARISON:  02/07/2024.    FINDINGS:  2 view chest.  Mediastinal silhouette is within normal limits.  The lungs " are clear.  No pneumothorax or pleural effusion.  No acute osseous finding.  Impression: No acute finding in the chest.    Finalized on: 3/26/2024 2:54 PM By:  Justino Brannon MD  R# [id number]      2024-03-26 14:56:50.226    [facility]    Recent Labs     03/26/24  1453   PH 7.410   PCO2 38.9   PO2 91   HCO3 24.6   POCSATURATED 97   BE 0         PRE OPERATIVE PULMONARY RISK ASSESSMENT:      SURGEON: hysterectomy and bladder repair 4/11/24 with Dr. Lisha Dodd and Dr. Pee Buchanan     ANESTHESIA: GA     Patient is a moderate risk for perioperative pulmonary complications due to:      [x]            abdominal surgery and duration  [x]            General Anesthsia  [x]            long-acting neuromuscular blockade.     Risks and possible pulmonary complications of surgery include risk of respiratory failure requiring mechanical ventilation, post operative pneumonia, post operative atelectasis, deep venous thrombosis, pulmonary embolism and death.    Based on my assessment , it is okay to proceed with procedure PROVIDED RISK IS ACCEPTABLE TO BOTH THE PATIENT AND THE SURGEON PERFORMING THE SURGERY.     ARISCAT Score 34       0 to 25 points: Low risk: 1.6% pulmonary complication rate   26 to 44 points: Intermediate risk: 13.3% pulmonary complication rate   45 to 123 points: High risk: 42.1% pulmonary complication rate            RECOMMENDATIONS FOR RISK MITIGATION:    1. Preoperative pulmonary workup:  Complete  2. Change in pulmonary medication regimen before surgery: none, continue medication regimen including morning of surgery, with sip of water.  3. Prophylaxis for cardiac events with perioperative beta-blockers: should be considered, specific regimen per anesthesia.  4. Invasive hemodynamic monitoring perioperatively: should be considered.  5. Deep vein thrombosis prophylaxis postoperatively:regimen to be chosen by surgical team.  6. Surveillance for postoperative MI with ECG immediately postoperatively and on  postoperative days 1 and 2 AND troponin levels 24 hours postoperatively and on day 4 or hospital discharge (whichever comes first): should be considered.  7. Begin patient education regarding lung-expansion maneuvers like deep breathing and incentive spirometry.   8. Limit duration of surgery to less than 3 hr if possible.  9. Use spinal or epidural anesthesia if possible.  10. Avoid use of pancuronium or long acting neuromuscular blockers.  11. Use deep-breathing exercises or incentive spirometry.  12. Assure perioperative thromboprophylaxis and adequate post operative analgesia.             Assessment/Plan:       Problem List Items Addressed This Visit          Pulmonary    Pre-operative respiratory examination     Risks and possible pulmonary complications of surgery include risk of respiratory failure requiring mechanical ventilation, post operative pneumonia, post operative atelectasis, deep venous thrombosis, pulmonary embolism and death.    Based on my assessment , it is okay to proceed with procedure PROVIDED RISK IS ACCEPTABLE TO BOTH THE PATIENT AND THE SURGEON PERFORMING THE SURGERY.         Solitary pulmonary nodule - PET negative - Primary     Follow up Chest CT 5/2024            Follow up 5/2024 as scheduled for Chest CT follow up.    Discussed diagnosis, its evaluation, treatment and usual course. All questions answered.    Patient verbalized understanding of plan and left in no acute distress    Thank you for the courtesy of participating in the care of this patient    Brittany Aragon PA-C  [facility]

## 2024-03-27 LAB
BRPFT: NORMAL
FEF 25 75 CHG: -16.3 %
FEF 25 75 LLN: 0.75
FEF 25 75 POST REF: 86.4 %
FEF 25 75 PRE REF: 103.2 %
FEF 25 75 REF: 1.88
FET100 CHG: 48.3 %
FEV1 CHG: -4.6 %
FEV1 FVC CHG: 0.9 %
FEV1 FVC LLN: 66
FEV1 FVC POST REF: 109.3 %
FEV1 FVC PRE REF: 108.3 %
FEV1 FVC REF: 79
FEV1 LLN: 1.55
FEV1 POST REF: 59 %
FEV1 PRE REF: 61.8 %
FEV1 REF: 2.19
FVC CHG: -5.4 %
FVC LLN: 2.01
FVC POST REF: 53.6 %
FVC PRE REF: 56.7 %
FVC REF: 2.8
PEF CHG: -38 %
PEF LLN: 3.47
PEF POST REF: 50.3 %
PEF PRE REF: 81.1 %
PEF REF: 5.66
POST FEF 25 75: 1.62 L/S
POST FET 100: 10.31 SEC
POST FEV1 FVC: 85.89 %
POST FEV1: 1.29 L
POST FVC: 1.5 L
POST PEF: 2.85 L/S
PRE FEF 25 75: 1.94 L/S
PRE FET 100: 6.95 SEC
PRE FEV1 FVC: 85.14 %
PRE FEV1: 1.35 L
PRE FVC: 1.59 L
PRE PEF: 4.59 L/S

## 2024-03-27 NOTE — ASSESSMENT & PLAN NOTE
Risks and possible pulmonary complications of surgery include risk of respiratory failure requiring mechanical ventilation, post operative pneumonia, post operative atelectasis, deep venous thrombosis, pulmonary embolism and death.    Based on my assessment , it is okay to proceed with procedure PROVIDED RISK IS ACCEPTABLE TO BOTH THE PATIENT AND THE SURGEON PERFORMING THE SURGERY.

## 2024-05-10 ENCOUNTER — TELEPHONE (OUTPATIENT)
Dept: PULMONOLOGY | Facility: CLINIC | Age: 69
End: 2024-05-10
Payer: MEDICARE

## 2024-05-10 NOTE — TELEPHONE ENCOUNTER
----- Message from Meron Oconnor sent at 5/10/2024  9:08 AM CDT -----  Contact: [phone]  Patient is calling in regards to rescheduling her ct and dr bauman Pt stated that she has conflicting visits on that day. Please call her back at [phone]. Thanks KB

## 2024-05-31 ENCOUNTER — HOSPITAL ENCOUNTER (OUTPATIENT)
Dept: RADIOLOGY | Facility: HOSPITAL | Age: 69
Discharge: HOME | End: 2024-05-31
Attending: OBSTETRICS & GYNECOLOGY
Payer: MEDICARE

## 2024-05-31 DIAGNOSIS — R93.89 ABNORMAL FINDING ON IMAGING: ICD-10-CM

## 2024-05-31 DIAGNOSIS — C54.1 MALIGNANT NEOPLASM OF ENDOMETRIUM: ICD-10-CM

## 2024-05-31 DIAGNOSIS — R91.1 LUNG NODULE: ICD-10-CM

## 2024-05-31 PROCEDURE — A9552 F18 FDG: HCPCS | Performed by: RADIOLOGY

## 2024-05-31 PROCEDURE — 78815 PET IMAGE W/CT SKULL-THIGH: CPT | Mod: TC,PS

## 2024-05-31 PROCEDURE — 78815 PET IMAGE W/CT SKULL-THIGH: CPT | Mod: 26,PS,, | Performed by: RADIOLOGY

## 2024-05-31 RX ORDER — FLUDEOXYGLUCOSE F18 500 MCI/ML
11.5 INJECTION INTRAVENOUS
Status: COMPLETED | OUTPATIENT
Start: 2024-05-31 | End: 2024-05-31

## 2024-05-31 RX ADMIN — FLUDEOXYGLUCOSE F-18 11.5 MILLICURIE: 500 INJECTION INTRAVENOUS at 10:05

## 2024-06-05 ENCOUNTER — OFFICE VISIT (OUTPATIENT)
Dept: PULMONOLOGY | Facility: CLINIC | Age: 69
End: 2024-06-05
Attending: INTERNAL MEDICINE
Payer: MEDICARE

## 2024-06-05 ENCOUNTER — TELEPHONE (OUTPATIENT)
Dept: PULMONOLOGY | Facility: CLINIC | Age: 69
End: 2024-06-05
Payer: MEDICARE

## 2024-06-05 VITALS
SYSTOLIC BLOOD PRESSURE: 120 MMHG | OXYGEN SATURATION: 97 % | RESPIRATION RATE: 12 BRPM | WEIGHT: 175.81 LBS | DIASTOLIC BLOOD PRESSURE: 80 MMHG | HEART RATE: 82 BPM | BODY MASS INDEX: 27.6 KG/M2 | HEIGHT: 67 IN

## 2024-06-05 DIAGNOSIS — I10 HYPERTENSION, UNSPECIFIED TYPE: ICD-10-CM

## 2024-06-05 DIAGNOSIS — R91.1 SOLITARY PULMONARY NODULE: Primary | ICD-10-CM

## 2024-06-05 PROCEDURE — 1101F PT FALLS ASSESS-DOCD LE1/YR: CPT | Mod: CPTII,S$GLB,, | Performed by: PHYSICIAN ASSISTANT

## 2024-06-05 PROCEDURE — 1159F MED LIST DOCD IN RCRD: CPT | Mod: CPTII,S$GLB,, | Performed by: PHYSICIAN ASSISTANT

## 2024-06-05 PROCEDURE — 3079F DIAST BP 80-89 MM HG: CPT | Mod: CPTII,S$GLB,, | Performed by: PHYSICIAN ASSISTANT

## 2024-06-05 PROCEDURE — 3008F BODY MASS INDEX DOCD: CPT | Mod: CPTII,S$GLB,, | Performed by: PHYSICIAN ASSISTANT

## 2024-06-05 PROCEDURE — 99213 OFFICE O/P EST LOW 20 MIN: CPT | Mod: PBBFAC | Performed by: PHYSICIAN ASSISTANT

## 2024-06-05 PROCEDURE — 1160F RVW MEDS BY RX/DR IN RCRD: CPT | Mod: CPTII,S$GLB,, | Performed by: PHYSICIAN ASSISTANT

## 2024-06-05 PROCEDURE — 99214 OFFICE O/P EST MOD 30 MIN: CPT | Mod: S$GLB,,, | Performed by: PHYSICIAN ASSISTANT

## 2024-06-05 PROCEDURE — 3074F SYST BP LT 130 MM HG: CPT | Mod: CPTII,S$GLB,, | Performed by: PHYSICIAN ASSISTANT

## 2024-06-05 PROCEDURE — 99999 PR PBB SHADOW E&M-EST. PATIENT-LVL III: CPT | Mod: PBBFAC,,, | Performed by: PHYSICIAN ASSISTANT

## 2024-06-05 PROCEDURE — 3288F FALL RISK ASSESSMENT DOCD: CPT | Mod: CPTII,S$GLB,, | Performed by: PHYSICIAN ASSISTANT

## 2024-06-05 NOTE — PROGRESS NOTES
Subjective:       Patient ID: Yokasta Pizano is a 68 y.o. female.    Chief Complaint: lung nodule      6/5/2024  Here for lung nodule follow up  Was scheduled for Chest CT this morning but canceled as she had PET CT ordered by GYN last month - No interval change in the non FDG avid 16 mm left lower lobe pulmonary nodule  She denies chest pain, cough, fever, or SOB  Has f/u apt with GYN to review imaging    3/2024  Here for pulmonary risk assessment visit  New patient to me, last seen by Dr. Spence for restrictive lung disease and pulmonary nodule  PET CT 11/2023: 1.6 cm nodular opacity in the left lung base is non FDG avid.  Hypermetabolic axillary lymph nodes, had biopsy 12/2023 - negative for malignancy    She is scheduled for hysterectomy and bladder repair 4/11/24 with Dr. Lisha Dodd and Dr. Pee Buchanan    Last surgery was last month had kidney cyst drained - no post operative complications    She is doing well today without cough, SOB, chest pain, or fever  No respiratory infections in the last month  Completed Chest X Ray, zenaida and ABG    Immunization History   Administered Date(s) Administered    COVID-19, MRNA, LN-S, PF (Pfizer) (Purple Cap) 03/04/2021, 03/25/2021, 12/18/2021    COVID-19, mRNA, LNP-S, bivalent booster, PF (PFIZER OMICRON) 12/10/2022    Influenza (FLUBLOK) - Quadrivalent - Recombinant - PF *Preferred* (egg allergy) 02/13/2022    Influenza - Quadrivalent - High Dose - PF (65 years and older) 02/05/2023    Influenza - Quadrivalent - PF *Preferred* (6 months and older) 10/18/2019, 12/08/2020      Tobacco Use: Low Risk  (6/5/2024)    Patient History     Smoking Tobacco Use: Never     Smokeless Tobacco Use: Never     Passive Exposure: Not on file      Past Medical History:   Diagnosis Date    Essential (primary) hypertension     Solitary pulmonary nodule - PET negative 11/10/2023    Type 2 diabetes mellitus without complications     Uterine prolapse       Current Outpatient Medications  "on File Prior to Visit   Medication Sig Dispense Refill    amLODIPine (NORVASC) 5 MG tablet Take 5 mg by mouth.      ibandronate (BONIVA) 150 mg tablet Take 150 mg by mouth every 30 days.      JARDIANCE 10 mg tablet Take 10 mg by mouth.      simvastatin (ZOCOR) 10 MG tablet Take 10 mg by mouth every evening.      solifenacin (VESICARE) 5 MG tablet Take 5 mg by mouth.      TRADJENTA 5 mg Tab tablet Take 5 mg by mouth.       No current facility-administered medications on file prior to visit.        Review of Systems   Constitutional:  Negative for fever, weight loss, appetite change and weakness.   HENT:  Negative for postnasal drip, rhinorrhea, sinus pressure, trouble swallowing and congestion.    Respiratory:  Negative for cough, sputum production, choking, chest tightness, shortness of breath, wheezing and dyspnea on extertion.    Cardiovascular:  Negative for chest pain and leg swelling.   Musculoskeletal:  Negative for joint swelling.   Gastrointestinal:  Negative for nausea and vomiting.   Neurological:  Negative for dizziness, weakness and headaches.   All other systems reviewed and are negative.      Objective:       Vitals:    06/05/24 0827   BP: 120/80   Pulse: 82   Resp: 12   SpO2: 97%   Weight: 79.8 kg (175 lb 13.1 oz)   Height: 5' 7" (1.702 m)       Physical Exam   Constitutional: She is oriented to person, place, and time. She appears well-developed and well-nourished. No distress.   HENT:   Head: Normocephalic.   Nose: Nose normal.   Mouth/Throat: Oropharynx is clear and moist.   Cardiovascular: Normal rate and regular rhythm.   Pulmonary/Chest: Effort normal. No respiratory distress. She has no wheezes. She has no rhonchi. She has no rales.   Musculoskeletal:         General: No edema.      Cervical back: Normal range of motion and neck supple.   Neurological: She is alert and oriented to person, place, and time. Gait normal.   Skin: Skin is warm and dry.   Psychiatric: She has a normal mood and " affect.   Vitals reviewed.    Personal Diagnostic Review    NM PET CT FDG Skull Base to Mid Thigh  Narrative: EXAMINATION:  NM PET CT FDG SKULL BASE TO MID THIGH    CLINICAL HISTORY:  Endometrial cancer without to myometrial invasion discovered incidentally at pathology status post hysterectomy and right salpingo oophorectomy for uterine prolapse.  Left lower lobe pulmonary nodule.  Restaging.    TECHNIQUE:  11.50 mCi of F18-FDG was administered intravenously in the left hand.  After an approximately 60 min distribution time, PET/CT images were acquired from the skull base to the mid thigh. Transmission images were acquired to correct for attenuation using a whole body low-dose CT scan without contrast with the arms positioned above the head. Glycemia at the time of injection was 90 mg/dL.    COMPARISON:  PET CT, 11/08/2023    FINDINGS:  Quality of the study: Adequate.    SUV max of the liver parenchyma is 2.9.    Neck: No FDG abnormal avidity.  No lymphadenopathy or mass.    Chest: Stable 16 mm left lower lobe pulmonary nodule, non FDG avid.  No FDG avid mediastinal or hilar lymphadenopathy.  Interval decrease in size of the right axillary lymph nodes seen on the prior exam.  No pleural effusion.    Abdomen/pelvis: Status post hysterectomy.  The right mid abdomen mesenteric lymph node/nodule measuring 17 x 11 mm shows mild FDG avidity with SUV max 3.4.  The right lower quadrant mesenteric lymph node/nodule measuring 21 x 15 mm shows moderate FDG avidity with SUV max 6.8.  Interval decrease in size and FDG avidity of the left pelvic sidewall lymph node with no FDG avidity on today's exam.  Nonenlarged right inguinal lymph node containing a fatty hilum but showing moderate FDG avidity with SUV max 4.8.    Skeletal structures: No abnormal FDG avidity in the skeleton.  No focal lytic or sclerotic lesion.    Physiologic uptake of the tracer is present within the brain, salivary glands, myocardium, GI and   "tracts.    Incidental CT findings: N/A  Impression: 1. No significant interval change in the 2 mesenteric lymph nodes or nodules showing mild to moderate FDG avidity compared to the prior exam.  2. Interval decrease in FDG avidity and size of the right axillary lymph nodes and left pelvic sidewall lymph node since the prior exam.  3. No interval change in the non FDG avid 16 mm left lower lobe pulmonary nodule.    Electronically signed by: Cristino Beckman MD  Date:    05/31/2024  Time:    12:59    No results for input(s): "PH", "PCO2", "PO2", "HCO3", "POCSATURATED", "BE" in the last 72 hours.                Assessment/Plan:       Problem List Items Addressed This Visit          Pulmonary    Solitary pulmonary nodule - PET negative - Primary     Repeat PET scan 5/2024 ordered by Dr. Loza, No interval change in the non FDG avid 16 mm left lower lobe pulmonary nodule.   Follow up imaging 1 year            Cardiac/Vascular    Hypertension     Stable, continue current medication management             Discussed diagnosis, its evaluation, treatment and usual course. All questions answered.    Patient verbalized understanding of plan and left in no acute distress    Thank you for the courtesy of participating in the care of this patient    Brittany Aragon PA-C  [facility]      "

## 2024-06-05 NOTE — ASSESSMENT & PLAN NOTE
Repeat PET scan 5/2024 ordered by Dr. Loza, No interval change in the non FDG avid 16 mm left lower lobe pulmonary nodule.   Follow up imaging 1 year

## 2024-06-10 ENCOUNTER — TELEPHONE (OUTPATIENT)
Dept: PULMONOLOGY | Facility: CLINIC | Age: 69
End: 2024-06-10
Payer: MEDICARE